# Patient Record
Sex: FEMALE | Race: WHITE | NOT HISPANIC OR LATINO | Employment: OTHER | RURAL
[De-identification: names, ages, dates, MRNs, and addresses within clinical notes are randomized per-mention and may not be internally consistent; named-entity substitution may affect disease eponyms.]

---

## 2017-06-23 ENCOUNTER — OFFICE VISIT (OUTPATIENT)
Dept: RETAIL CLINIC | Facility: CLINIC | Age: 62
End: 2017-06-23

## 2017-06-23 VITALS
WEIGHT: 153.6 LBS | RESPIRATION RATE: 16 BRPM | HEART RATE: 79 BPM | TEMPERATURE: 97 F | BODY MASS INDEX: 28.26 KG/M2 | OXYGEN SATURATION: 95 % | HEIGHT: 62 IN
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
## 2017-06-23 PROCEDURE — 99202 OFFICE O/P NEW SF 15 MIN: CPT | Performed by: NURSE PRACTITIONER

## 2017-06-23 RX ORDER — LEVOTHYROXINE SODIUM 0.05 MG/1
50 TABLET ORAL DAILY
COMMUNITY

## 2017-06-23 RX ORDER — NAPROXEN 500 MG/1
500 TABLET ORAL 2 TIMES DAILY WITH MEALS
COMMUNITY
End: 2021-11-16

## 2017-06-23 RX ORDER — FLUOXETINE HYDROCHLORIDE 40 MG/1
40 CAPSULE ORAL DAILY
COMMUNITY

## 2017-06-23 RX ORDER — OMEPRAZOLE 40 MG/1
40 CAPSULE, DELAYED RELEASE ORAL DAILY
COMMUNITY

## 2017-06-23 RX ORDER — PREDNISONE 10 MG/1
TABLET ORAL DAILY
Qty: 21 EACH | Refills: 0 | Status: SHIPPED | OUTPATIENT
Start: 2017-06-23 | End: 2017-06-29

## 2017-06-23 RX ORDER — ROSUVASTATIN CALCIUM 20 MG/1
20 TABLET, COATED ORAL DAILY
COMMUNITY

## 2017-06-23 NOTE — PATIENT INSTRUCTIONS
Insect Bite  Mosquitoes, flies, fleas, bedbugs, and many other insects can bite. Insect bites are different from insect stings. A sting is when poison (venom) is injected into the skin. Insect bites can cause pain or itching for a few days, but they are usually not serious. Some insects can spread diseases to people through a bite.  SYMPTOMS   Symptoms of an insect bite include:  · Itching or pain in the bite area.  · Redness and swelling in the bite area.  · An open wound (skin ulcer).  In many cases, symptoms last for 2-4 days.   DIAGNOSIS   This condition is usually diagnosed based on symptoms and a physical exam.  TREATMENT   Treatment is usually not needed for an insect bite. Symptoms often go away on their own. Your health care provider may recommend creams or lotions to help reduce itching. Antibiotic medicines may be prescribed if the bite becomes infected. A tetanus shot may be given in some cases. If you develop an allergic reaction to an insect bite, your health care provider will prescribe medicines to treat the reaction (antihistamines). This is rare.  HOME CARE INSTRUCTIONS  · Do not scratch the bite area.  · Keep the bite area clean and dry. Wash the bite area daily with soap and water as told by your health care provider.  · If directed, apply ice to the bite area.    Put ice in a plastic bag.    Place a towel between your skin and the bag.    Leave the ice on for 20 minutes, 2-3 times per day.  · To help reduce itching and swelling, try applying a baking soda paste, cortisone cream, or calamine lotion to the bite area as told by your health care provider.  · Apply or take over-the-counter and prescription medicines only as told by your health care provider.  · If you were prescribed an antibiotic medicine, use it as told by your health care provider. Do not stop using the antibiotic even if your condition improves.  · Keep all follow-up visits as told by your health care provider. This is  important.  PREVENTION   · Use insect repellent. The best insect repellents contain:    DEET, picaridin, oil of lemon eucalyptus (OLE), or [id number].    Higher amounts of an active ingredient.  · When you are outdoors, wear clothing that covers your arms and legs.  · Avoid opening windows that do not have window screens.  SEEK MEDICAL CARE IF:  · You have increased redness, swelling, or pain in the bite area.  · You have a fever.  SEEK IMMEDIATE MEDICAL CARE IF:   · You have joint pain.    · You have fluid, blood, or pus coming from the bite area.  · You have a headache or neck pain.  · You have unusual weakness.  · You have a rash.  · You have chest pain or shortness of breath.  · You have abdominal pain, nausea, or vomiting.  · You feel unusually tired or sleepy.     This information is not intended to replace advice given to you by your health care provider. Make sure you discuss any questions you have with your health care provider.     Document Released: 01/25/2006 Document Revised: 04/10/2017 Document Reviewed: 05/04/2016  [employer] Interactive Patient Education ©2017 [employer]

## 2017-06-23 NOTE — PROGRESS NOTES
"Subjective   Alanna Vicente is a 62 y.o. female.     Insect Bite   This is a new problem. Episode onset: 2 days. The problem occurs rarely. The problem has been gradually worsening. Pertinent negatives include no chills, fever or rash. Nothing aggravates the symptoms. Treatments tried: topical antiitch cream. The treatment provided no relief.        The following portions of the patient's history were reviewed and updated as appropriate: allergies, current medications, past medical history, past social history, past surgical history and problem list.    Review of Systems   Constitutional: Negative for chills and fever.   Respiratory: Negative.  Negative for chest tightness, shortness of breath and wheezing.    Cardiovascular: Negative.    Musculoskeletal: Negative.    Skin: Negative for rash.        Redness, swelling and severe itching r/t two insect bites on right upper leg, worsening, unresponsive to otc medications   Hematological: Negative for adenopathy.        Pulse 79  Temp 97 °F (36.1 °C) (Temporal Artery )   Resp 16  Ht 61.5\" (156.2 cm)  Wt 153 lb 9.6 oz (69.7 kg)  LMP 01/01/2008  SpO2 95%  BMI 28.55 kg/m2     Objective   Physical Exam   Constitutional: She is oriented to person, place, and time. She appears well-developed and well-nourished. No distress.   Neurological: She is alert and oriented to person, place, and time.   Skin: Skin is warm and dry. There is erythema (indurated areas with central sting or bite yue x 2 on right posterior upper leg, no exudate noted, no other signs of infection noted).        Psychiatric: She has a normal mood and affect. Her behavior is normal. Thought content normal.   Vitals reviewed.      Assessment/Plan   Alanna was seen today for cellulitis.    Diagnoses and all orders for this visit:    Insect bite, initial encounter  -     PredniSONE (DELTASONE) 10 MG (21) tablet pack; Take  by mouth Daily for 6 days. Use as directed on package  -     mupirocin (BACTROBAN) " 2 % ointment; Apply  topically 3 (Three) Times a Day.

## 2018-08-31 ENCOUNTER — OFFICE VISIT (OUTPATIENT)
Dept: RETAIL CLINIC | Facility: CLINIC | Age: 63
End: 2018-08-31

## 2018-08-31 VITALS
RESPIRATION RATE: 12 BRPM | BODY MASS INDEX: 28.6 KG/M2 | SYSTOLIC BLOOD PRESSURE: 128 MMHG | WEIGHT: 155.4 LBS | OXYGEN SATURATION: 98 % | DIASTOLIC BLOOD PRESSURE: 84 MMHG | HEIGHT: 62 IN | HEART RATE: 94 BPM | TEMPERATURE: 97.8 F

## 2018-08-31 DIAGNOSIS — J01.00 ACUTE NON-RECURRENT MAXILLARY SINUSITIS: Primary | ICD-10-CM

## 2018-08-31 DIAGNOSIS — J02.9 SORE THROAT: ICD-10-CM

## 2018-08-31 PROCEDURE — 99213 OFFICE O/P EST LOW 20 MIN: CPT | Performed by: NURSE PRACTITIONER

## 2018-08-31 PROCEDURE — 87880 STREP A ASSAY W/OPTIC: CPT | Performed by: NURSE PRACTITIONER

## 2018-08-31 RX ORDER — DOXYCYCLINE HYCLATE 100 MG/1
100 CAPSULE ORAL 2 TIMES DAILY
Qty: 20 CAPSULE | Refills: 0 | Status: SHIPPED | OUTPATIENT
Start: 2018-08-31 | End: 2018-09-10

## 2018-08-31 RX ORDER — IBUPROFEN 800 MG/1
800 TABLET ORAL EVERY 8 HOURS PRN
Qty: 90 TABLET | Refills: 0 | Status: SHIPPED | OUTPATIENT
Start: 2018-08-31

## 2018-08-31 RX ORDER — METHYLPREDNISOLONE 4 MG/1
TABLET ORAL
Qty: 21 TABLET | Refills: 0 | Status: SHIPPED | OUTPATIENT
Start: 2018-08-31 | End: 2021-11-16

## 2021-11-16 ENCOUNTER — OFFICE VISIT (OUTPATIENT)
Dept: NEUROSURGERY | Facility: CLINIC | Age: 66
End: 2021-11-16

## 2021-11-16 VITALS — TEMPERATURE: 97.1 F | BODY MASS INDEX: 29.11 KG/M2 | HEIGHT: 62 IN | WEIGHT: 158.2 LBS | RESPIRATION RATE: 15 BRPM

## 2021-11-16 DIAGNOSIS — M47.819 FACET ARTHROPATHY: ICD-10-CM

## 2021-11-16 DIAGNOSIS — M54.9 MECHANICAL BACK PAIN: Primary | ICD-10-CM

## 2021-11-16 DIAGNOSIS — M51.36 DDD (DEGENERATIVE DISC DISEASE), LUMBAR: ICD-10-CM

## 2021-11-16 DIAGNOSIS — M43.16 SPONDYLOLISTHESIS OF LUMBAR REGION: ICD-10-CM

## 2021-11-16 PROCEDURE — 99203 OFFICE O/P NEW LOW 30 MIN: CPT | Performed by: NEUROLOGICAL SURGERY

## 2021-11-16 RX ORDER — ALLOPURINOL 100 MG/1
TABLET ORAL
COMMUNITY
Start: 2021-11-08

## 2021-11-16 RX ORDER — SULFAMETHOXAZOLE AND TRIMETHOPRIM 800; 160 MG/1; MG/1
TABLET ORAL
COMMUNITY
Start: 2021-10-04

## 2021-11-16 RX ORDER — PHENAZOPYRIDINE HYDROCHLORIDE 200 MG/1
TABLET, FILM COATED ORAL
COMMUNITY
Start: 2021-10-04

## 2021-11-16 NOTE — PROGRESS NOTES
Patient: Alanna Vicente  : 1955    Primary Care Provider: Juli Zamora DO    Requesting Provider: As above        History    Chief Complaint: Low back pain with intermittent numbness involving the entire left leg.    History of Present Illness: Ms. Vicente is a 66-year-old retired woman who formerly worked for the [employer].  For several months she has had some low-grade more annoying back pain.  She has no radicular leg pain.  Intermittently her entire left leg will go numb.  She is not sure what makes it actually better.  She is worse with walking.  She has not undergone any formal treatment.  She has no bowel or bladder dysfunction.    Review of Systems   Constitutional: Negative for activity change, appetite change, chills, diaphoresis, fatigue, fever and unexpected weight change.   HENT: Negative for congestion, dental problem, drooling, ear discharge, ear pain, facial swelling, hearing loss, mouth sores, nosebleeds, postnasal drip, rhinorrhea, sinus pressure, sneezing, sore throat, tinnitus, trouble swallowing and voice change.    Eyes: Negative for photophobia, pain, discharge, redness, itching and visual disturbance.   Respiratory: Negative for apnea, cough, choking, chest tightness, shortness of breath, wheezing and stridor.    Cardiovascular: Negative for chest pain, palpitations and leg swelling.   Gastrointestinal: Negative for abdominal distention, abdominal pain, anal bleeding, blood in stool, constipation, diarrhea, nausea, rectal pain and vomiting.   Endocrine: Negative for cold intolerance, heat intolerance, polydipsia, polyphagia and polyuria.   Genitourinary: Negative for decreased urine volume, difficulty urinating, dysuria, enuresis, flank pain, frequency, genital sores, hematuria and urgency.   Musculoskeletal: Positive for back pain. Negative for arthralgias, gait problem, joint swelling, myalgias, neck pain and neck stiffness.   Skin: Negative for color  "change, pallor, rash and wound.   Allergic/Immunologic: Positive for environmental allergies. Negative for food allergies and immunocompromised state.   Neurological: Negative for dizziness, tremors, seizures, syncope, facial asymmetry, speech difficulty, weakness, light-headedness, numbness and headaches.   Hematological: Negative for adenopathy. Does not bruise/bleed easily.   Psychiatric/Behavioral: Negative for agitation, behavioral problems, confusion, decreased concentration, dysphoric mood, hallucinations, self-injury, sleep disturbance and suicidal ideas. The patient is not nervous/anxious and is not hyperactive.    All other systems reviewed and are negative.      The patient's past medical history, past surgical history, family history, and social history have been reviewed at length in the electronic medical record.    Physical Exam:   Temp 97.1 °F (36.2 °C) (Infrared)   Resp 15   Ht 156.2 cm (61.5\")   Wt 71.8 kg (158 lb 3.2 oz)   LMP 01/01/2008   BMI 29.41 kg/m²   CONSTITUTIONAL: Patient is well-nourished, pleasant and appears stated age.  CV: Heart regular rate and rhythm without murmur, rub, or gallop.  PULMONARY: Lungs are clear to ascultation.  MUSCULOSKELETAL:  Straight leg raising is negative.  Carlos Manuel's Sign is negative.  ROM in the low back is normal.  Tenderness in the back to palpation is not observed.  NEUROLOGICAL:  Orientation, memory, attention span, language function, and cognition have been examined and are intact.  Strength is intact in the lower extremities to direct testing.  Muscle tone is normal throughout.  Station and gait are normal.  Sensation is intact to light touch testing throughout.  Deep tendon reflexes are 1+ and symmetrical.  Coordination is intact.      Medical Decision Making    Data Review:   (All imaging studies were personally reviewed unless stated otherwise)  MRI of the lumbar spine dated 9/15/2021 demonstrates multilevel degenerative disc disease and diffuse " facet arthropathy.  There is mild stenosis at L2-3.  There is more moderate stenosis at L3-4.  There is generous grade stenosis at L4-5 where there is a low-grade offset and mild bilateral joint effusions.  There is epidural lipomatosis most pronounced at L5-S1.    Diagnosis:   1.  Mechanical low back pain.  2.  Nonradicular global numbness intermittently afflicting the left leg.    Treatment Options:   The patient has a number of diffuse issues throughout her spine.  Her symptoms are currently more annoying than debilitating.  Presently, there is no role for surgical intervention and treatment should remain symptomatic.       Diagnosis Plan   1. Mechanical back pain     2. DDD (degenerative disc disease), lumbar     3. Spondylolisthesis of lumbar region     4. Facet arthropathy         Scribed for Enrrique Buckley MD by Kirsten Palm CMA on 11/16/2021 13:25 EST       I, Dr. Buckley, personally performed the services described in the documentation, as scribed in my presence, and it is both accurate and complete.

## 2023-02-06 ENCOUNTER — HOSPITAL ENCOUNTER (OUTPATIENT)
Age: 68
End: 2023-02-06
Payer: MEDICARE

## 2023-02-06 DIAGNOSIS — N95.0: ICD-10-CM

## 2023-02-06 DIAGNOSIS — N85.9: ICD-10-CM

## 2023-02-06 DIAGNOSIS — D25.9: Primary | ICD-10-CM

## 2023-02-06 PROCEDURE — 76830 TRANSVAGINAL US NON-OB: CPT

## 2023-12-19 ENCOUNTER — OUTSIDE FACILITY SERVICE (OUTPATIENT)
Dept: CARDIOLOGY | Facility: CLINIC | Age: 68
End: 2023-12-19
Payer: MEDICARE